# Patient Record
Sex: MALE | Race: WHITE | NOT HISPANIC OR LATINO | Employment: OTHER | URBAN - METROPOLITAN AREA
[De-identification: names, ages, dates, MRNs, and addresses within clinical notes are randomized per-mention and may not be internally consistent; named-entity substitution may affect disease eponyms.]

---

## 2017-02-22 ENCOUNTER — ALLSCRIPTS OFFICE VISIT (OUTPATIENT)
Dept: OTHER | Facility: OTHER | Age: 82
End: 2017-02-22

## 2017-03-17 ENCOUNTER — GENERIC CONVERSION - ENCOUNTER (OUTPATIENT)
Dept: OTHER | Facility: OTHER | Age: 82
End: 2017-03-17

## 2017-05-24 DIAGNOSIS — I48.91 ATRIAL FIBRILLATION (HCC): ICD-10-CM

## 2017-06-12 DIAGNOSIS — N18.30 CHRONIC KIDNEY DISEASE, STAGE III (MODERATE) (HCC): ICD-10-CM

## 2017-06-23 ENCOUNTER — ALLSCRIPTS OFFICE VISIT (OUTPATIENT)
Dept: OTHER | Facility: OTHER | Age: 82
End: 2017-06-23

## 2017-10-23 DIAGNOSIS — I48.91 ATRIAL FIBRILLATION (HCC): ICD-10-CM

## 2017-10-23 DIAGNOSIS — N18.30 CHRONIC KIDNEY DISEASE, STAGE III (MODERATE) (HCC): ICD-10-CM

## 2017-10-23 DIAGNOSIS — M15.9 POLYOSTEOARTHRITIS: ICD-10-CM

## 2017-10-23 DIAGNOSIS — I10 ESSENTIAL (PRIMARY) HYPERTENSION: ICD-10-CM

## 2017-11-09 ENCOUNTER — ALLSCRIPTS OFFICE VISIT (OUTPATIENT)
Dept: OTHER | Facility: OTHER | Age: 82
End: 2017-11-09

## 2017-11-10 NOTE — PROGRESS NOTES
Assessment    1  Atrial fibrillation (427 31) (I48 91)   2  Benign essential hypertension (401 1) (I10)   3  CKD (chronic kidney disease), stage III (585 3) (N18 3)   4  Osteoarthritis, multiple sites (715 89) (M15 9)    Plan  Atrial fibrillation, Benign essential hypertension, CKD (chronic kidney disease), stage III,Osteoarthritis, multiple sites    · (1) CBC/ PLT (NO DIFF); Status:Active; Requested for:02Apr2018;    · (1) COMPREHENSIVE METABOLIC PANEL; Status:Active; Requested for:02Apr2018;    · (1) LIPID PANEL, FASTING; Status:Active; Requested for:02Apr2018;    · (1) URINALYSIS (will reflex a microscopy if leukocytes, occult blood, protein or nitrites are not withinnormal limits); Status:Active; Requested for:02Apr2018; Influenza vaccine needed    · Fluzone High-Dose 0 5 ML Intramuscular Suspension Prefilled Syringe  PMH: Screening for genitourinary condition    · *VB - Urinary Incontinence Screen (Dx Z13 89 Screen for UI) ; every 1 year; Last 64AGC1003; [id number]; Status:Active  Screening for genitourinary condition    · *VB - Urinary Incontinence Screen (Dx Z13 89 Screen for UI); Status:Complete - Retrospective ByProtocol Authorization;   Done: 29HGP9170 03:00PM  Screening for neurological condition    · Falls Risk Assessment (Dx Z13 89 Screen for Neurologic Disorder) ; every 1 year; Last 00WMO5634;AYEM 39VCS5113; Status:Active  Unlinked    · PHQ-2 Adult Depression Screening ; every 1 year; Last 68ACO5857; Next 93UMV3074; Status:Active    Discussion/Summary  Discussion Summary:   The patient appears to be remaining in normal sinus rhythm  We will continue Coumadin  No other changes are necessary to his medications  He has had no issues with any bleeding  At this time he does not wish to pursue any type of surgical intervention regarding his toes  Chief Complaint  Chief Complaint Free Text Note Form: Patient is here for 6 months f/u for atria fibrillation, Hypertension, CKD and osteoarthritis  Review blood work  does not have any new complains at the moment  Chief Complaint Chronic Condition [facility]: Patient is here today for follow up of chronic conditions described in HPI  History of Present Illness  HPI: Patient presents to the office for follow-up visit for stage III kidney disease, hypertension, chronic atrial fibrillation and her 2  Osteoarthritis  He offers no new complaints  Denies any palpitations or chest pain  He denies any peripheral edema  Arthritis has been stable and tolerable  Appetite is good and his weight has been stable  No fevers or chills  He denies any indigestion, constipation, dysuria  His lab work is reviewed  His metabolic panel reveals stable renal function with a creatinine of 1 32 BUN of 23  Electrolytes are normal liver function studies are normal  Total cholesterol is 209 triglycerides 106 HDL cholesterol 67 and LDL cholesterol of 121 CBC is within normal limits  Hemoglobin 13 4 hematocrit 38 6, white count 5900 and platelet count 504,215  Review of Systems  Complete-Male:  Constitutional: No fever or chills, feels well, no tiredness, no recent weight gain or weight loss  Eyes: No complaints of eye pain, no red eyes, no discharge from eyes, no itchy eyes  ENT: no complaints of earache, no hearing loss, no nosebleeds, no nasal discharge, no sore throat, no hoarseness  Cardiovascular: No complaints of slow heart rate, no fast heart rate, no chest pain, no palpitations, no leg claudication, no lower extremity  Respiratory: No complaints of shortness of breath, no wheezing, no cough, no SOB on exertion, no orthopnea or PND  Gastrointestinal: No complaints of abdominal pain, no constipation, no nausea or vomiting, no diarrhea or bloody stools  Musculoskeletal: Arthritic toes of both feet  Integumentary: No complaints of skin rash or skin lesions, no itching, no skin wound, no dry skin    Neurological: No compliants of headache, no confusion, no convulsions, no numbness or tingling, no dizziness or fainting, no limb weakness, no difficulty walking  Psychiatric: Is not suicidal, no sleep disturbances, no anxiety or depression, no change in personality, no emotional problems  Endocrine: No complaints of proptosis, no hot flashes, no muscle weakness, no erectile dysfunction, no deepening of the voice, no feelings of weakness  Hematologic/Lymphatic: No complaints of swollen glands, no swollen glands in the neck, does not bleed easily, no easy bruising  Preventive Quality 65 Older:  Preventive Quality 65 and Older: Falls Risk: The patient fell 0 times in the past 12 months  The patient currently has no urinary incontinence symptoms  Active Problems  1  Atrial fibrillation (427 31) (I48 91)   2  Benign essential hypertension (401 1) (I10)   3  CKD (chronic kidney disease), stage III (585 3) (N18 3)   4  Copy of advance directive in patient's chart (V49 89) (Z78 9)   5  Osteoarthritis, multiple sites (715 89) (M15 9)    Past Medical History  1  History of Acute kidney injury (584 9) (N17 9)   2  History of Acute laryngitis (464 00) (J04 0)   3  History of Acute upper respiratory infection (465 9) (J06 9)   4  History of Anxiety (300 00) (F41 9)   5  History of Arthritis (V13 4)   6  History of Callus of foot (700) (L84)   7  History of Cellulitis of left foot (682 7) (L03 116)   8  History of Cough (786 2) (R05)   9  History of Depression screening (V79 0) (Z13 89)   10  History of Edema extremities (782 3) (R60 0)   11  History of Esophagitis, reflux (530 11) (K21 0)   12  History of Foreign body in eye, left, initial encounter (76 310 744) (T15 92XA)   13  History of allergic rhinitis (V12 69) (Z87 09)   14  History of angina pectoris (V12 59) (Z86 79)   15  History of arthritis (V13 4) (Z87 39)   16  History of atrial fibrillation (V12 59) (Z86 79)   17  History of cataract (V12 49) (Z86 69)   18  History of chest pain (V13 89) (Z87 898)   19   History of chest pain (V13 89) (Z87 898)   20  History of dizziness (V13 89) (Z87 898)   21  History of gastroesophageal reflux (GERD) (V12 79) (Z87 19)   22  History of hypertension (V12 59) (Z86 79)   23  History of impacted cerumen (V12 49) (Z86 69)   24  History of rheumatic fever (V12 09) (Z86 79)   25  History of right bundle branch block (V15 1) (Z98 89)   26  History of shortness of breath (V13 89) (Z87 898)   27  History of Non-healing wound   28  History of Osteoarthritis of both knees (715 96) (M17 0)   29  History of Tachycardia (785 0) (R00 0)   30  History of Urinary tract infection (599 0) (N39 0)   31  History of Venous thrombosis (453 9) (I82 90)   32  History of Ventral hernia (553 20) (K43 9)   33  History of Vitamin D deficiency (268 9) (E55 9)  Active Problems And Past Medical History Reviewed: The active problems and past medical history were reviewed and updated today  Surgical History  1  Denied: History Of Prior Surgery    Family History  Mother    1  No pertinent family history  Family History    2  Family history of Coronary Artery Disease (V17 49)    Social History     · Alcohol Use (History)   · Beer Consumption (4  Bottles Per Day)   · Caffeine Use   · Copy of advance directive in patient's chart (V49 89) (Z78 9)   · Former smoker (J99 43) (A56 978)   · Never Used Drugs  Social History Reviewed: The social history was reviewed and updated today  The social history was reviewed and is unchanged  Current Meds   1  DilTIAZem HCl ER Coated Beads 240 MG Oral Capsule Extended Release 24 Hour; TAKE 1 CAPSULE DAILY  Requested for: 96UCR6535; Last Rx:31Oct2017 Ordered   2  Losartan Potassium 50 MG Oral Tablet; TAKE 1 TABLET DAILY; Therapy: 09JGG4129 to (Evaluate:27Jan2018)  Requested for: 84Rnh5401; Last Rx:97Ijt6848 Ordered   3  Nitrostat 0 4 MG Sublingual Tablet Sublingual; PLACE 1 TABLET UNDER THE TONGUE EVERY 5 MINUTES FOR UP TO 3 DOSES AS NEEDED FOR CHEST PAIN  CALL 911 IF PAIN PERSISTS  Requested for: 16WUN6515; Last Rx:28Jan2016 Ordered   4  Omeprazole 20 MG Oral Capsule Delayed Release; TAKE 1 CAPSULE DAILY; Therapy: (Recorded:00Yni2906) to Recorded   5  Warfarin Sodium 5 MG Oral Tablet; TAKE 1 TABLET DAILY AS DIRECTED  Requested for: 69UOJ6787; Last Rx:36Sxy9370; Status: ACTIVE - Renewal Denied Ordered  Medication List Reviewed: The medication list was reviewed and updated today  Allergies  1  Augmentin TABS   2  Indocin CAPS   3  amlodipine   4  Nitro-Bid OINT   5  Penicillins    Vitals  Vital Signs    Recorded: 09TIQ4219 02:15PM   Temperature 98 5 F, Oral   Heart Rate 76   Systolic 415, LUE, Sitting   Diastolic 70, LUE, Sitting   Height 5 ft 7 in   Weight 176 lb    BMI Calculated 27 57   BSA Calculated 1 92   O2 Saturation 98       Physical Exam   Constitutional  General appearance: No acute distress, well appearing and well nourished  Eyes  Conjunctiva and lids: No swelling, erythema, or discharge  Pupils and irises: Equal, round and reactive to light  Ears, Nose, Mouth, and Throat  External inspection of ears and nose: Normal    Pulmonary  Respiratory effort: No increased work of breathing or signs of respiratory distress  Auscultation of lungs: Clear to auscultation, equal breath sounds bilaterally, no wheezes, no rales, no rhonci  Cardiovascular  Palpation of heart: Normal PMI, no thrills  Auscultation of heart: Abnormal  -- Soft 1/6 systolic murmur heard best along the left upper sternal border second intercostal space without radiation  Examination of extremities for edema and/or varicosities: Normal    Carotid pulses: Normal    Abdomen  Abdomen: Non-tender, no masses  Lymphatic  Palpation of lymph nodes in neck: No lymphadenopathy  Musculoskeletal  Gait and station: Normal    Digits and nails: Abnormal  -- Bilateral hammertoes  Arthritis of the metacarpophalangeal joints of both hands    Inspection/palpation of joints, bones, and muscles: Normal    Skin  Skin and subcutaneous tissue: Normal without rashes or lesions  Neurologic No focal sensory or motor deficits are appreciated  Psychiatric  Orientation to person, place and time: Normal    Mood and affect: Normal          Results/Data  *VB - Urinary Incontinence Screen (Dx Z13 89 Screen for UI) 38WDS3353 03:00PM Javier Cavazos     Test Name Result Flag Reference   Urinary Incontinence Assessment 69CVJ3810       PHQ-2 Adult Depression Screening 39WRB3435 02:58PM User, Ahs     Test Name Result Flag Reference   PHQ-2 Adult Depression Score 0       Over the last two weeks, how often have you been bothered by any of the following problems? Little interest or pleasure in doing things: Not at all - 0 Feeling down, depressed, or hopeless: Not at all - 0   PHQ-2 Adult Depression Screening Negative       Falls Risk Assessment (Dx Z13 89 Screen for Neurologic Disorder) 59UYG2053 02:56PM User, Ahs     Test Name Result Flag Reference   Falls Risk      Falls with injury in the past year         Health Management  History of Screening for genitourinary condition   *VB - Urinary Incontinence Screen (Dx Z13 89 Screen for UI); every 1 year; Last 92HBU9138; NextDue: 93DED2681; Active  History of Screening for neurological condition   *VB - Fall Risk Assessment  (Dx Z13 89 Screen for Neurologic Disorder); every 1 year; [id number]; Next Due: 09Mbq6704; Overdue  Screening for neurological condition   Falls Risk Assessment (Dx Z13 89 Screen for Neurologic Disorder); every 1 year; Last 61VMF1309;UTLF Due: 20KVE2455; Active  Health Maintenance   Medicare Annual Wellness Visit; every 1 year; Last 34Qbb3376; Next Due: 75Ncx1254;  Overdue    Signatures   Electronically signed by : William Ko MD; Nov 9 2017  3:39PM EST                       (Author)

## 2017-11-30 ENCOUNTER — GENERIC CONVERSION - ENCOUNTER (OUTPATIENT)
Dept: OTHER | Facility: OTHER | Age: 82
End: 2017-11-30

## 2017-11-30 DIAGNOSIS — I48.91 ATRIAL FIBRILLATION (HCC): ICD-10-CM

## 2017-12-01 ENCOUNTER — GENERIC CONVERSION - ENCOUNTER (OUTPATIENT)
Dept: OTHER | Facility: OTHER | Age: 82
End: 2017-12-01

## 2018-01-12 DIAGNOSIS — I48.91 ATRIAL FIBRILLATION (HCC): ICD-10-CM

## 2018-01-13 VITALS
BODY MASS INDEX: 27.62 KG/M2 | DIASTOLIC BLOOD PRESSURE: 70 MMHG | SYSTOLIC BLOOD PRESSURE: 124 MMHG | WEIGHT: 176 LBS | HEIGHT: 67 IN | OXYGEN SATURATION: 98 % | HEART RATE: 76 BPM | TEMPERATURE: 98.5 F

## 2018-01-14 VITALS
TEMPERATURE: 98.1 F | WEIGHT: 179 LBS | HEART RATE: 84 BPM | BODY MASS INDEX: 28.09 KG/M2 | OXYGEN SATURATION: 98 % | DIASTOLIC BLOOD PRESSURE: 68 MMHG | SYSTOLIC BLOOD PRESSURE: 120 MMHG | HEIGHT: 67 IN

## 2018-01-14 VITALS
OXYGEN SATURATION: 98 % | SYSTOLIC BLOOD PRESSURE: 140 MMHG | HEIGHT: 67 IN | HEART RATE: 78 BPM | TEMPERATURE: 97.8 F | DIASTOLIC BLOOD PRESSURE: 60 MMHG | WEIGHT: 180.38 LBS | BODY MASS INDEX: 28.31 KG/M2

## 2018-01-15 ENCOUNTER — GENERIC CONVERSION - ENCOUNTER (OUTPATIENT)
Dept: OTHER | Facility: OTHER | Age: 83
End: 2018-01-15

## 2018-01-16 NOTE — MISCELLANEOUS
Message   Recorded as Task   Date: 11/30/2017 12:18 PM, Created By: Sky Kong   Task Name: Care Coordination   Assigned To: Terrell Cano   Regarding Patient: Nic Cuello, Status: Active   Comment:    Sky Kong - 30 Nov 2017 12:18 PM     TASK CREATED  Jaydaangelica Savage from [facility] called and needs a standing order for patients inr faxed to them at [phone] - 30 Nov 2017 1:03 PM     TASK REASSIGNED: Previously Assigned To Jony Hooper - 30 Nov 2017 4:58 PM     TASK EDITED  Order processed and faxed as requested  Active Problems    1  Atrial fibrillation (427 31) (I48 91)   2  Benign essential hypertension (401 1) (I10)   3  CKD (chronic kidney disease), stage III (585 3) (N18 3)   4  Copy of advance directive in patient's chart (V49 89) (Z78 9)   5  Osteoarthritis, multiple sites (715 89) (M15 9)   6  Screening for genitourinary condition (V81 6) (Z13 89)   7  Screening for neurological condition (V80 09) (Z13 89)    Current Meds   1  DilTIAZem HCl ER Coated Beads 240 MG Oral Capsule Extended Release 24 Hour;   TAKE 1 CAPSULE DAILY  Requested for: 66DXY7966; Last Rx:31Oct2017 Ordered   2  Losartan Potassium 50 MG Oral Tablet; TAKE 1 TABLET DAILY; Therapy: 09SGJ9467 to (Evaluate:27Jan2018)  Requested for: 73Wwu4120; Last   Rx:44Gca8501 Ordered   3  Nitrostat 0 4 MG Sublingual Tablet Sublingual (Nitroglycerin); PLACE 1 TABLET UNDER   THE TONGUE EVERY 5 MINUTES FOR UP TO 3 DOSES AS NEEDED   FOR CHEST PAIN  CALL 911 IF PAIN PERSISTS  Requested for: 37ORV2639; Last   Rx:28Jan2016 Ordered   4  Omeprazole 20 MG Oral Capsule Delayed Release; TAKE 1 CAPSULE DAILY; Therapy: (Recorded:93Tem4181) to Recorded   5  Warfarin Sodium 5 MG Oral Tablet; TAKE 1 TABLET DAILY AS DIRECTED  Requested   for: 30DSD8480; Last Rx:23Wci6884; Status: ACTIVE - Renewal Denied Ordered    Allergies    1  Augmentin TABS   2  Indocin CAPS   3  amlodipine   4  Nitro-Bid OINT   5  Penicillins    Plan  Atrial fibrillation    · (1) PT WITH INR; Status:Active - Retrospective By Protocol Authorization;  Requested  for:30Nov2017;     Signatures   Electronically signed by : Gopi Martin RN; Nov 30 2017  4:58PM EST                       (Author)

## 2018-01-31 DIAGNOSIS — I10 ESSENTIAL HYPERTENSION, BENIGN: Primary | ICD-10-CM

## 2018-01-31 RX ORDER — LOSARTAN POTASSIUM 50 MG/1
1 TABLET ORAL DAILY
COMMUNITY
Start: 2014-11-10 | End: 2018-01-31 | Stop reason: SDUPTHER

## 2018-02-01 RX ORDER — LOSARTAN POTASSIUM 50 MG/1
50 TABLET ORAL DAILY
Qty: 90 TABLET | Refills: 1 | Status: SHIPPED | OUTPATIENT
Start: 2018-02-01 | End: 2018-08-02 | Stop reason: SDUPTHER

## 2018-02-20 LAB — INR PPP: 3.3 (ref 0.86–1.16)

## 2018-02-23 ENCOUNTER — ANTICOAG VISIT (OUTPATIENT)
Dept: INTERNAL MEDICINE CLINIC | Age: 83
End: 2018-02-23

## 2018-02-23 ENCOUNTER — TELEPHONE (OUTPATIENT)
Dept: INTERNAL MEDICINE CLINIC | Facility: CLINIC | Age: 83
End: 2018-02-23

## 2018-02-23 DIAGNOSIS — I48.20 CHRONIC ATRIAL FIBRILLATION (HCC): Primary | ICD-10-CM

## 2018-03-05 ENCOUNTER — TELEPHONE (OUTPATIENT)
Dept: INTERNAL MEDICINE CLINIC | Age: 83
End: 2018-03-05

## 2018-03-13 DIAGNOSIS — I48.91 ATRIAL FIBRILLATION, UNSPECIFIED TYPE (HCC): Primary | ICD-10-CM

## 2018-03-13 RX ORDER — WARFARIN SODIUM 5 MG/1
5 TABLET ORAL DAILY
Qty: 90 TABLET | Refills: 0 | Status: SHIPPED | OUTPATIENT
Start: 2018-03-13 | End: 2018-06-11 | Stop reason: SDUPTHER

## 2018-03-13 RX ORDER — WARFARIN SODIUM 5 MG/1
1 TABLET ORAL DAILY
COMMUNITY
End: 2018-03-13 | Stop reason: SDUPTHER

## 2018-03-27 LAB — INR PPP: 2.5 (ref 0.86–1.16)

## 2018-03-28 ENCOUNTER — ANTICOAG VISIT (OUTPATIENT)
Dept: INTERNAL MEDICINE CLINIC | Age: 83
End: 2018-03-28

## 2018-04-02 DIAGNOSIS — I48.91 ATRIAL FIBRILLATION (HCC): ICD-10-CM

## 2018-04-02 DIAGNOSIS — M15.9 POLYOSTEOARTHRITIS: ICD-10-CM

## 2018-04-02 DIAGNOSIS — N18.30 CHRONIC KIDNEY DISEASE, STAGE III (MODERATE) (HCC): ICD-10-CM

## 2018-04-02 DIAGNOSIS — I10 ESSENTIAL (PRIMARY) HYPERTENSION: ICD-10-CM

## 2018-04-19 LAB — INR PPP: 2.9 (ref 0.86–1.16)

## 2018-04-23 ENCOUNTER — ANTICOAG VISIT (OUTPATIENT)
Dept: INTERNAL MEDICINE CLINIC | Age: 83
End: 2018-04-23

## 2018-04-26 ENCOUNTER — OFFICE VISIT (OUTPATIENT)
Dept: INTERNAL MEDICINE CLINIC | Facility: CLINIC | Age: 83
End: 2018-04-26
Payer: COMMERCIAL

## 2018-04-26 VITALS
HEART RATE: 80 BPM | BODY MASS INDEX: 26.64 KG/M2 | HEIGHT: 68 IN | OXYGEN SATURATION: 98 % | WEIGHT: 175.8 LBS | TEMPERATURE: 98.2 F | SYSTOLIC BLOOD PRESSURE: 126 MMHG | DIASTOLIC BLOOD PRESSURE: 66 MMHG

## 2018-04-26 DIAGNOSIS — I48.20 CHRONIC ATRIAL FIBRILLATION (HCC): ICD-10-CM

## 2018-04-26 DIAGNOSIS — R04.0 EPISTAXIS: ICD-10-CM

## 2018-04-26 DIAGNOSIS — I10 HYPERTENSION, UNSPECIFIED TYPE: Primary | ICD-10-CM

## 2018-04-26 DIAGNOSIS — I10 BENIGN ESSENTIAL HYPERTENSION: ICD-10-CM

## 2018-04-26 DIAGNOSIS — N18.30 CKD (CHRONIC KIDNEY DISEASE), STAGE III (HCC): ICD-10-CM

## 2018-04-26 DIAGNOSIS — R07.9 CHEST PAIN, UNSPECIFIED TYPE: ICD-10-CM

## 2018-04-26 DIAGNOSIS — M15.9 PRIMARY OSTEOARTHRITIS INVOLVING MULTIPLE JOINTS: ICD-10-CM

## 2018-04-26 PROCEDURE — 3074F SYST BP LT 130 MM HG: CPT | Performed by: INTERNAL MEDICINE

## 2018-04-26 PROCEDURE — 99214 OFFICE O/P EST MOD 30 MIN: CPT | Performed by: INTERNAL MEDICINE

## 2018-04-26 PROCEDURE — 3078F DIAST BP <80 MM HG: CPT | Performed by: INTERNAL MEDICINE

## 2018-04-26 RX ORDER — NITROGLYCERIN 0.4 MG/1
1 TABLET SUBLINGUAL
COMMUNITY
End: 2018-04-26 | Stop reason: SDUPTHER

## 2018-04-26 RX ORDER — DILTIAZEM HYDROCHLORIDE 240 MG/1
1 CAPSULE, COATED, EXTENDED RELEASE ORAL DAILY
COMMUNITY
End: 2018-04-26 | Stop reason: SDUPTHER

## 2018-04-26 RX ORDER — DILTIAZEM HYDROCHLORIDE 240 MG/1
240 CAPSULE, COATED, EXTENDED RELEASE ORAL DAILY
Qty: 90 CAPSULE | Refills: 1 | Status: SHIPPED | OUTPATIENT
Start: 2018-04-26 | End: 2018-10-16 | Stop reason: SDUPTHER

## 2018-04-26 RX ORDER — NITROGLYCERIN 0.4 MG/1
0.4 TABLET SUBLINGUAL
Qty: 90 TABLET | Refills: 1 | Status: SHIPPED | OUTPATIENT
Start: 2018-04-26

## 2018-04-26 NOTE — PROGRESS NOTES
Assessment/Plan:    Atrial fibrillation (HCC)  Controlled ventricular response no change in medications  Benign essential hypertension    Blood pressure is well controlled  Lab work essentially stable  No changes are necessary to current medications  Osteoarthritis, multiple sites    Patient could possibly benefit from some podiatric surgery but is reluctant to do so    CKD (chronic kidney disease), stage III   Renal function has remained stable  His urinalysis shows micro hematuria  Previous ultrasounds of the kidneys were normal     Epistaxis    No bleeding at the present time  A small focus with eschar formation and signs of recent hemorrhage noted in the left nasal septum  Diagnoses and all orders for this visit:    Hypertension, unspecified type  -     diltiazem (CARDIZEM CD) 240 mg 24 hr capsule; Take 1 capsule (240 mg total) by mouth daily  -     Comprehensive metabolic panel; Future  -     CBC and differential; Future    Chest pain, unspecified type  -     nitroglycerin (NITROSTAT) 0 4 mg SL tablet; Place 1 tablet (0 4 mg total) under the tongue every 5 (five) minutes as needed for chest pain  -     Comprehensive metabolic panel; Future  -     CBC and differential; Future    Primary osteoarthritis involving multiple joints  -     Comprehensive metabolic panel; Future  -     CBC and differential; Future    Chronic atrial fibrillation (HCC)  -     Comprehensive metabolic panel; Future  -     CBC and differential; Future    Benign essential hypertension  -     Comprehensive metabolic panel; Future  -     CBC and differential; Future    CKD (chronic kidney disease), stage III  -     Comprehensive metabolic panel; Future  -     CBC and differential; Future    Epistaxis    Other orders  -     Discontinue: diltiazem (CARDIZEM CD) 240 mg 24 hr capsule;  Take 1 capsule by mouth daily  -     Discontinue: nitroglycerin (NITROSTAT) 0 4 mg SL tablet; Place 1 tablet under the tongue every 5 (five) minutes as needed          Subjective:      Patient ID: Chris Guerra is a 80 y o  male  Patient presents for follow-up visit for chronic atrial fibrillation, hypertension, stage 3 kidney disease, osteoarthritis of the toes and has no major complaints  He does experience some intermittent at episodes of epistaxis in his left nostril but has not experience any prolonged bleeding  He is on warfarin  The bleeding usually subsides with direct pressure  He offers no complaints regarding his blood pressure  He has not had any recent episodes of chest pain  His nitroglycerin prescription is over 3years old so he needs replacement  He has not used his nitroglycerin in over 2 years  Appetite has been good weight has been stable he denies any peripheral edema or swelling he denies any changes in his bowel habits  He gives no history of gross hematuria  Labs are reviewed  BUN is 22 creatinine 1 31 estimated GFR is 50  Electrolytes are normal   CBC was normal   Urinalysis showed some microscopic hematuria but was otherwise unremarkable  Family History   Problem Relation Age of Onset    No Known Problems Mother     Coronary artery disease Family     Emphysema Father      Social History     Social History    Marital status: /Civil Union     Spouse name: N/A    Number of children: N/A    Years of education: N/A     Occupational History    Not on file       Social History Main Topics    Smoking status: Former Smoker     Types: Cigarettes    Smokeless tobacco: Never Used    Alcohol use Yes      Comment: beer consumption (4 bottles per day)    Drug use: No    Sexual activity: Not on file     Other Topics Concern    Not on file     Social History Narrative    Caffeine use    Copy of advance directive in patient's chart     Past Medical History:   Diagnosis Date    Allergic rhinitis     last assessed: 11/10/2014    [facility]ina p[facility]is ([facility] [address] [facility]     Anxiety     Arthritis     last assessed: 8/3/2015   Josef Jackson Atrial fibrillation ([facility] [address] )     Callus of foot     last assessed: 9/7/2016    Cataract     CKD (chronic kidney disease), stage III 9/7/2016    Esophagitis, reflux     last assessed: 11/10/2014    Hypertension     Osteoarthritis of both knees     Rheumatic fever     Right bundle branch block     Shortness of breath     Tachycardia     Venous thrombosis     Ventral hernia     last assessed: 3/20/2015    Vitamin D deficiency        Current Outpatient Prescriptions:     diltiazem (CARDIZEM CD) 240 mg 24 hr capsule, Take 1 capsule (240 mg total) by mouth daily, Disp: 90 capsule, Rfl: 1    losartan (COZAAR) 50 mg tablet, Take 1 tablet (50 mg total) by mouth daily, Disp: 90 tablet, Rfl: 1    nitroglycerin (NITROSTAT) 0 4 mg SL tablet, Place 1 tablet (0 4 mg total) under the tongue every 5 (five) minutes as needed for chest pain, Disp: 90 tablet, Rfl: 1    warfarin (COUMADIN) 5 mg tablet, Take 1 tablet (5 mg total) by mouth daily or as directed, Disp: 90 tablet, Rfl: 0  Allergies   Allergen Reactions    Amlodipine     Augmentin  [Amoxicillin-Pot Clavulanate] GI Intolerance    Indomethacin GI Intolerance    Nitroglycerin     Penicillins      History reviewed  No pertinent surgical history  Review of Systems   Constitutional: Negative  HENT: Positive for nosebleeds  Eyes: Negative  Respiratory: Negative  Cardiovascular: Negative  Gastrointestinal: Negative  Endocrine: Negative  Genitourinary: Negative  Musculoskeletal: Negative  Allergic/Immunologic: Negative  Neurological: Negative  Hematological: Negative  Psychiatric/Behavioral: Negative  Objective:      /66 (BP Location: Left arm, Patient Position: Sitting, Cuff Size: Standard)   Pulse 80   Temp 98 2 °F (36 8 °C) (Oral)   Ht 5' 8" (1 727 m)   Wt 79 7 kg (175 lb 12 8 oz)   SpO2 98%   BMI 26 73 kg/m²          Physical Exam   Constitutional: He is oriented to person, place, and time   He appears well-developed and well-nourished  HENT:   Head: Normocephalic and atraumatic  Nose: Epistaxis (Healed area of bleeding noted in the left nasal septum) is observed  Eyes: Conjunctivae and EOM are normal  Pupils are equal, round, and reactive to light  Neck: Normal range of motion  Neck supple  No JVD present  No tracheal deviation present  No thyromegaly present  Cardiovascular: Normal heart sounds and intact distal pulses  No murmur heard  Irregularly irregular, no murmur appreciated  Ventricular response 76   Pulmonary/Chest: Effort normal and breath sounds normal  No respiratory distress  He has no rales  Abdominal: Soft  Bowel sounds are normal  He exhibits no distension  There is no tenderness  Musculoskeletal: Normal range of motion  He exhibits deformity (Arthritic toes with hammertoe deformity and bunion formation bilaterally)  He exhibits no edema  Lymphadenopathy:     He has no cervical adenopathy  Neurological: He is alert and oriented to person, place, and time  He has normal reflexes  No cranial nerve deficit  Coordination normal    Skin: Skin is warm and dry  Psychiatric: He has a normal mood and affect  Thought content normal    Vitals reviewed

## 2018-04-26 NOTE — ASSESSMENT & PLAN NOTE
Blood pressure is well controlled  Lab work essentially stable  No changes are necessary to current medications

## 2018-04-26 NOTE — ASSESSMENT & PLAN NOTE
No bleeding at the present time  A small focus with eschar formation and signs of recent hemorrhage noted in the left nasal septum

## 2018-04-26 NOTE — ASSESSMENT & PLAN NOTE
Renal function has remained stable  His urinalysis shows micro hematuria    Previous ultrasounds of the kidneys were normal

## 2018-05-18 ENCOUNTER — TELEPHONE (OUTPATIENT)
Dept: INTERNAL MEDICINE CLINIC | Facility: CLINIC | Age: 83
End: 2018-05-18

## 2018-05-22 ENCOUNTER — ANTICOAG VISIT (OUTPATIENT)
Dept: INTERNAL MEDICINE CLINIC | Facility: CLINIC | Age: 83
End: 2018-05-22

## 2018-05-22 LAB — INR PPP: 3.1 (ref 0.86–1.17)

## 2018-06-07 ENCOUNTER — ANTICOAG VISIT (OUTPATIENT)
Dept: INTERNAL MEDICINE CLINIC | Facility: CLINIC | Age: 83
End: 2018-06-07

## 2018-06-07 LAB — INR PPP: 2.5 (ref 0.86–1.17)

## 2018-06-10 DIAGNOSIS — I48.91 ATRIAL FIBRILLATION, UNSPECIFIED TYPE (HCC): ICD-10-CM

## 2018-06-10 RX ORDER — WARFARIN SODIUM 5 MG/1
5 TABLET ORAL DAILY
Qty: 90 TABLET | Refills: 0 | Status: CANCELLED | OUTPATIENT
Start: 2018-06-10

## 2018-06-11 DIAGNOSIS — I48.91 ATRIAL FIBRILLATION, UNSPECIFIED TYPE (HCC): ICD-10-CM

## 2018-06-12 RX ORDER — WARFARIN SODIUM 5 MG/1
5 TABLET ORAL DAILY
Qty: 90 TABLET | Refills: 1 | Status: SHIPPED | OUTPATIENT
Start: 2018-06-12 | End: 2018-12-31 | Stop reason: SDUPTHER

## 2018-07-09 ENCOUNTER — ANTICOAG VISIT (OUTPATIENT)
Dept: INTERNAL MEDICINE CLINIC | Facility: CLINIC | Age: 83
End: 2018-07-09

## 2018-07-09 DIAGNOSIS — I48.20 CHRONIC ATRIAL FIBRILLATION (HCC): Primary | ICD-10-CM

## 2018-07-09 LAB — INR PPP: 2.3 (ref 0.86–1.17)

## 2018-07-27 DIAGNOSIS — I10 ESSENTIAL HYPERTENSION, BENIGN: ICD-10-CM

## 2018-07-27 RX ORDER — LOSARTAN POTASSIUM 50 MG/1
50 TABLET ORAL DAILY
Qty: 90 TABLET | Refills: 1 | OUTPATIENT
Start: 2018-07-27

## 2018-08-02 DIAGNOSIS — I10 ESSENTIAL HYPERTENSION, BENIGN: ICD-10-CM

## 2018-08-02 RX ORDER — LOSARTAN POTASSIUM 50 MG/1
50 TABLET ORAL DAILY
Qty: 90 TABLET | Refills: 1 | Status: SHIPPED | OUTPATIENT
Start: 2018-08-02 | End: 2018-10-24 | Stop reason: SDUPTHER

## 2018-08-03 ENCOUNTER — OFFICE VISIT (OUTPATIENT)
Dept: INTERNAL MEDICINE CLINIC | Facility: CLINIC | Age: 83
End: 2018-08-03
Payer: COMMERCIAL

## 2018-08-03 VITALS
SYSTOLIC BLOOD PRESSURE: 132 MMHG | BODY MASS INDEX: 26.1 KG/M2 | OXYGEN SATURATION: 98 % | DIASTOLIC BLOOD PRESSURE: 70 MMHG | HEIGHT: 68 IN | WEIGHT: 172.2 LBS | HEART RATE: 95 BPM | TEMPERATURE: 98.4 F

## 2018-08-03 DIAGNOSIS — J32.1 FRONTAL SINUSITIS, UNSPECIFIED CHRONICITY: Primary | ICD-10-CM

## 2018-08-03 DIAGNOSIS — H61.23 BILATERAL IMPACTED CERUMEN: ICD-10-CM

## 2018-08-03 PROBLEM — H61.20 CERUMEN IMPACTION: Status: ACTIVE | Noted: 2018-08-03

## 2018-08-03 PROBLEM — J32.9 SINUSITIS: Status: ACTIVE | Noted: 2018-08-03

## 2018-08-03 PROCEDURE — 69210 REMOVE IMPACTED EAR WAX UNI: CPT | Performed by: NURSE PRACTITIONER

## 2018-08-03 PROCEDURE — 99213 OFFICE O/P EST LOW 20 MIN: CPT | Performed by: NURSE PRACTITIONER

## 2018-08-03 PROCEDURE — 1160F RVW MEDS BY RX/DR IN RCRD: CPT | Performed by: NURSE PRACTITIONER

## 2018-08-03 RX ORDER — CEFUROXIME AXETIL 500 MG/1
250 TABLET ORAL EVERY 12 HOURS SCHEDULED
Qty: 7 TABLET | Refills: 0 | Status: SHIPPED | OUTPATIENT
Start: 2018-08-03 | End: 2018-08-10

## 2018-08-03 NOTE — ASSESSMENT & PLAN NOTE
Flushed years in office today, advised patient to get over-the-counter Debrox drops to help soften the wax

## 2018-08-03 NOTE — PROGRESS NOTES
Assessment/Plan:    Sinusitis  Will start patient on Ceftin BID for 7 days  Of note patient has completed his pneumonia vaccination  Patient does history of GI upset with Augmentin and penicillin  Advised patient to take antibiotic with food to prevent GI upset  Rest and fluids advised  Educated that the course of this illness could be 2-4 weeks  Discussed symptomatic relief, such as warm steam inhalations, tylenol/ibuprofen for fevers and body aches, rest, and drink plenty of fluids  Warm salt gargles for sore throat  Discussed red flag signs to go to the ER, such as chest pain or shortness of breath  Return to the office for reevaluation if symptoms worsen or do not improve in 1-2 weeks      Cerumen impaction   Flushed years in office today, advised patient to get over-the-counter Debrox drops to help soften the wax  Diagnoses and all orders for this visit:    Frontal sinusitis, unspecified chronicity  -     cefuroxime (CEFTIN) 500 mg tablet; Take 0 5 tablets (250 mg total) by mouth every 12 (twelve) hours for 7 days    Bilateral impacted cerumen    Other orders  -     Ear cerumen removal          Subjective:      Patient ID: Guillermina Fierro is a 80 y o  male  Patient presents today with complaints of cough, chest congestion, nasal congestion  Patient states that the symptoms have been going on for about 10 days and are gradually getting worse  He states that his wife is currently in the hospital for a fall and he has been visiting her  The day after she was admitted he had visited her and came home and was sick  Patient does have history of both the pneumonia vaccinations  He has history of smoking and denies history of seasonal allergies  Cough   This is a new problem  Episode onset: 10 days ago  The problem has been waxing and waning  Cough characteristics: productive of yellow sputum   Associated symptoms include ear congestion, headaches, nasal congestion, postnasal drip, rhinorrhea and a sore throat  Pertinent negatives include no chest pain, chills, ear pain, fever, heartburn, rash, shortness of breath or wheezing  Nothing aggravates the symptoms  Treatments tried: Claratin  There is no history of asthma, environmental allergies or pneumonia  The following portions of the patient's history were reviewed and updated as appropriate: allergies, current medications, past family history, past medical history, past social history, past surgical history and problem list     Review of Systems   Constitutional: Negative for activity change, appetite change, chills, diaphoresis, fatigue and fever  HENT: Positive for postnasal drip, rhinorrhea and sore throat  Negative for congestion, ear discharge, ear pain, sinus pain and sinus pressure  Eyes: Negative for pain, discharge, itching and visual disturbance  Respiratory: Positive for cough  Negative for chest tightness, shortness of breath and wheezing  Cardiovascular: Negative for chest pain, palpitations and leg swelling  Gastrointestinal: Negative for abdominal pain, constipation, diarrhea, heartburn, nausea and vomiting  Endocrine: Negative for polydipsia, polyphagia and polyuria  Genitourinary: Negative for difficulty urinating, dysuria and urgency  Musculoskeletal: Negative for arthralgias, back pain and neck pain  Skin: Negative for rash and wound  Allergic/Immunologic: Negative for environmental allergies  Neurological: Positive for headaches  Negative for dizziness, weakness and numbness           Past Medical History:   Diagnosis Date    Allergic rhinitis     last assessed: 11/10/2014    Angina pectoris ([facility] [address] )     Anxiety     Arthritis     last assessed: 8/3/2015    Atrial fibrillation (HCC)     Callus of foot     last assessed: 9/7/2016    Cataract     CKD (chronic kidney disease), stage III 9/7/2016    Esophagitis, reflux     last assessed: 11/10/2014    Hypertension     Osteoarthritis of both knees     Rheumatic fever     Right bundle branch block     Shortness of breath     Tachycardia     Venous thrombosis     Ventral hernia     last assessed: 3/20/2015    Vitamin D deficiency          Current Outpatient Prescriptions:     diltiazem (CARDIZEM CD) 240 mg 24 hr capsule, Take 1 capsule (240 mg total) by mouth daily, Disp: 90 capsule, Rfl: 1    losartan (COZAAR) 50 mg tablet, Take 1 tablet (50 mg total) by mouth daily, Disp: 90 tablet, Rfl: 1    nitroglycerin (NITROSTAT) 0 4 mg SL tablet, Place 1 tablet (0 4 mg total) under the tongue every 5 (five) minutes as needed for chest pain, Disp: 90 tablet, Rfl: 1    warfarin (COUMADIN) 5 mg tablet, Take 1 tablet (5 mg total) by mouth daily or as directed, Disp: 90 tablet, Rfl: 1    cefuroxime (CEFTIN) 500 mg tablet, Take 0 5 tablets (250 mg total) by mouth every 12 (twelve) hours for 7 days, Disp: 7 tablet, Rfl: 0    Allergies   Allergen Reactions    Amlodipine     Augmentin  [Amoxicillin-Pot Clavulanate] GI Intolerance    Indomethacin GI Intolerance    Nitroglycerin     Penicillins        Social History   History reviewed  No pertinent surgical history  Family History   Problem Relation Age of Onset    No Known Problems Mother     Coronary artery disease Family     Emphysema Father        Objective:  /70 (BP Location: Left arm, Patient Position: Sitting, Cuff Size: Standard)   Pulse 95   Temp 98 4 °F (36 9 °C) (Oral)   Ht 5' 8" (1 727 m)   Wt 78 1 kg (172 lb 3 2 oz)   SpO2 98%   BMI 26 18 kg/m²     Recent Results (from the past 1344 hour(s))   Protime-INR    Collection Time: 07/07/18 12:00 AM   Result Value Ref Range    INR 2 30 (A) 0 86 - 1 17            Physical Exam   Constitutional: He is oriented to person, place, and time  He appears well-developed and well-nourished  No distress  HENT:   Head: Normocephalic and atraumatic  Right Ear: External ear normal  Tympanic membrane is not erythematous and not bulging   A middle ear effusion is present  Left Ear: External ear normal  Tympanic membrane is not erythematous and not bulging  A middle ear effusion is present  Nose: Mucosal edema and rhinorrhea present  Right sinus exhibits frontal sinus tenderness  Right sinus exhibits no maxillary sinus tenderness  Left sinus exhibits frontal sinus tenderness  Left sinus exhibits no maxillary sinus tenderness  Mouth/Throat: Mucous membranes are pale and dry  Posterior oropharyngeal erythema present  No oropharyngeal exudate  Bilateral cerumen impaction   Eyes: Conjunctivae and EOM are normal  Pupils are equal, round, and reactive to light  Right eye exhibits no discharge  Left eye exhibits no discharge  Neck: Normal range of motion  Neck supple  No thyromegaly present  Cardiovascular: Normal rate, regular rhythm, normal heart sounds and intact distal pulses  Exam reveals no gallop and no friction rub  No murmur heard  Pulmonary/Chest: Effort normal and breath sounds normal  No stridor  No respiratory distress  He has no wheezes  He has no rales  Abdominal: Soft  Bowel sounds are normal  He exhibits no distension  There is no tenderness  Lymphadenopathy:     He has no cervical adenopathy  Neurological: He is alert and oriented to person, place, and time  Skin: Skin is warm and dry  No rash noted  He is not diaphoretic  No erythema  Psychiatric: He has a normal mood and affect  His behavior is normal  Judgment and thought content normal        Ear cerumen removal  Date/Time: 8/3/2018 10:21 AM  Performed by: Nicole Dennis Authorized by: Nicole Dennis      Patient location:  Other (comment)  Consent:     Consent obtained:  Verbal    Consent given by:  Patient    Risks discussed:  Bleeding, dizziness, infection, incomplete removal, pain and TM perforation  Universal protocol:     Patient identity confirmed:  Verbally with patient  Procedure details:     Local anesthetic:  None    Location:  R ear and L ear    Procedure type: irrigation      Approach:  External    Visualization (free text):    Otoscope    Equipment used:   alligator forceps  Post-procedure details:     Complication:  None    Hearing quality:  Normal    Patient tolerance of procedure:   Tolerated well, no immediate complications

## 2018-08-03 NOTE — ASSESSMENT & PLAN NOTE
Will start patient on Ceftin BID for 7 days  Of note patient has completed his pneumonia vaccination  Patient does history of GI upset with Augmentin and penicillin  Advised patient to take antibiotic with food to prevent GI upset  Rest and fluids advised  Educated that the course of this illness could be 2-4 weeks  Discussed symptomatic relief, such as warm steam inhalations, tylenol/ibuprofen for fevers and body aches, rest, and drink plenty of fluids  Warm salt gargles for sore throat  Discussed red flag signs to go to the ER, such as chest pain or shortness of breath     Return to the office for reevaluation if symptoms worsen or do not improve in 1-2 weeks

## 2018-09-05 ENCOUNTER — ANTICOAG VISIT (OUTPATIENT)
Dept: INTERNAL MEDICINE CLINIC | Facility: CLINIC | Age: 83
End: 2018-09-05

## 2018-09-05 LAB — INR PPP: 2.3 (ref 0.86–1.17)

## 2018-10-16 DIAGNOSIS — I10 HYPERTENSION, UNSPECIFIED TYPE: ICD-10-CM

## 2018-10-16 RX ORDER — DILTIAZEM HYDROCHLORIDE 240 MG/1
CAPSULE, COATED, EXTENDED RELEASE ORAL
Qty: 90 CAPSULE | Refills: 1 | Status: SHIPPED | OUTPATIENT
Start: 2018-10-16 | End: 2019-04-14 | Stop reason: SDUPTHER

## 2018-10-22 ENCOUNTER — ANTICOAG VISIT (OUTPATIENT)
Dept: INTERNAL MEDICINE CLINIC | Facility: CLINIC | Age: 83
End: 2018-10-22

## 2018-10-22 LAB — INR PPP: 3.6 (ref 0.86–1.17)

## 2018-10-22 NOTE — PROGRESS NOTES
Called pt  He confirmed current Coumadin dose 2 5 mg on Thur and 5 mg all other days  He will hold dose for 2 days then back to 2 5 mg Thur and 5 mg all other days    Recheck INR in 1 wk   Stephani Matias

## 2018-10-22 NOTE — PROGRESS NOTES
Please contact patient to inform him that his INR is 3 6  Confirm patient has been taking warfarin as prescribed, 2 5 mg on Thursdays and 5 mg remaining days  Recommendations are to hold warfarin for the next 2 days, then continue with current regimen as his INR has been stable at 2 3 over the past 3 months  Recheck INR in 1 week

## 2018-10-24 ENCOUNTER — OFFICE VISIT (OUTPATIENT)
Dept: INTERNAL MEDICINE CLINIC | Facility: CLINIC | Age: 83
End: 2018-10-24
Payer: COMMERCIAL

## 2018-10-24 VITALS
BODY MASS INDEX: 26.55 KG/M2 | DIASTOLIC BLOOD PRESSURE: 68 MMHG | TEMPERATURE: 98.2 F | OXYGEN SATURATION: 97 % | HEART RATE: 89 BPM | HEIGHT: 68 IN | WEIGHT: 175.2 LBS | SYSTOLIC BLOOD PRESSURE: 140 MMHG

## 2018-10-24 DIAGNOSIS — R04.0 EPISTAXIS: ICD-10-CM

## 2018-10-24 DIAGNOSIS — N18.30 CKD (CHRONIC KIDNEY DISEASE), STAGE III (HCC): ICD-10-CM

## 2018-10-24 DIAGNOSIS — I10 ESSENTIAL HYPERTENSION, BENIGN: ICD-10-CM

## 2018-10-24 DIAGNOSIS — I10 BENIGN ESSENTIAL HYPERTENSION: ICD-10-CM

## 2018-10-24 DIAGNOSIS — I48.20 CHRONIC ATRIAL FIBRILLATION (HCC): Primary | ICD-10-CM

## 2018-10-24 PROCEDURE — 4040F PNEUMOC VAC/ADMIN/RCVD: CPT | Performed by: INTERNAL MEDICINE

## 2018-10-24 PROCEDURE — 99214 OFFICE O/P EST MOD 30 MIN: CPT | Performed by: INTERNAL MEDICINE

## 2018-10-24 PROCEDURE — 1036F TOBACCO NON-USER: CPT | Performed by: INTERNAL MEDICINE

## 2018-10-24 RX ORDER — LOSARTAN POTASSIUM 100 MG/1
100 TABLET ORAL DAILY
Qty: 90 TABLET | Refills: 1 | Status: SHIPPED | OUTPATIENT
Start: 2018-10-24 | End: 2019-04-18 | Stop reason: SDUPTHER

## 2018-10-24 NOTE — PROGRESS NOTES
Assessment/Plan:    Atrial fibrillation (HCC)  Ventricular response stable PT INR was supratherapeutic  Patient has experienced some intermittent epistaxis  Dosage adjusted follow-up lab work scheduled  Benign essential hypertension  Blood pressure is on the high range of normal we will increase his Cozaar from 50 to 100 mg and monitor his renal function  CKD (chronic kidney disease), stage III  Renal function has remained stable  We will continue to monitor with the increase in losartan  He has been intolerant load of pain in the past     Epistaxis  Intermittent epistaxis  INR was subtherapeutic  Dosage adjusted  Diagnoses and all orders for this visit:    Chronic atrial fibrillation (HCC)  -     CBC and Platelet; Future  -     Comprehensive metabolic panel; Future  -     TSH, 3rd generation with Free T4 reflex; Future  -     T3; Future    Benign essential hypertension  -     Basic metabolic panel; Future  -     CBC and Platelet; Future  -     Comprehensive metabolic panel; Future  -     TSH, 3rd generation with Free T4 reflex; Future  -     T3; Future    CKD (chronic kidney disease), stage III (HCC)  -     Basic metabolic panel; Future  -     CBC and Platelet; Future  -     Comprehensive metabolic panel; Future    Epistaxis    Essential hypertension, benign  -     losartan (COZAAR) 100 MG tablet; Take 1 tablet (100 mg total) by mouth daily for 180 days          Subjective:      Patient ID: Kolton Medel is a 80 y o  male  Patient presents to the office for follow-up visit  In general he is feeling well  He has been experiencing intermittent episodes of epistaxis  His most recent INR was supratherapeutic  His dosage was adjusted and follow-up labs are pending  His blood pressure is noted to be on the high side of normal   He denies any symptoms  He is currently taking 50 mg of losartan  His labs were reviewed  Renal function has remained relatively stable    Hemoglobin hematocrit stable as well  He denies any episodes of palpitations, lightheadedness, chest pain, dizzy spells, weakness, numbness or tingling of the extremities  He has had no speech issues and denies any visual symptoms  Appetite has been good and weight has been stable  Family History   Problem Relation Age of Onset    No Known Problems Mother     Coronary artery disease Family     Emphysema Father      Social History     Social History    Marital status: /Civil Union     Spouse name: N/A    Number of children: N/A    Years of education: N/A     Occupational History    Not on file       Social History Main Topics    Smoking status: Former Smoker     Types: Cigarettes    Smokeless tobacco: Never Used    Alcohol use Yes      Comment: beer consumption (4 bottles per day)    Drug use: No    Sexual activity: Not on file     Other Topics Concern    Not on file     Social History Narrative    Caffeine use    Copy of advance directive in patient's chart     Past Medical History:   Diagnosis Date    Allergic rhinitis     last assessed: 11/10/2014    [facility] p[facility]is ([facility] )     Anxiety     Arthritis     last assessed: 8/3/2015    Atrial fibrillation (HCC)     Callus of foot     last assessed: 9/7/2016    Cataract     CKD (chronic kidney disease), stage III ([facility] ) 9/7/2016    Esophagitis, reflux     last assessed: 11/10/2014    Hypertension     Osteoarthritis of both knees     Rheumatic fever     Right bundle branch block     Shortness of breath     Tachycardia     Venous thrombosis     Ventral hernia     last assessed: 3/20/2015    Vitamin D deficiency        Current Outpatient Prescriptions:     diltiazem (CARDIZEM CD) 240 mg 24 hr capsule, TAKE 1 CAPSULE BY MOUTH EVERY DAY, Disp: 90 capsule, Rfl: 1    losartan (COZAAR) 100 MG tablet, Take 1 tablet (100 mg total) by mouth daily for 180 days, Disp: 90 tablet, Rfl: 1    nitroglycerin (NITROSTAT) 0 4 mg SL tablet, Place 1 tablet (0 4 mg total) under the tongue every 5 (five) minutes as needed for chest pain, Disp: 90 tablet, Rfl: 1    warfarin (COUMADIN) 5 mg tablet, Take 1 tablet (5 mg total) by mouth daily or as directed, Disp: 90 tablet, Rfl: 1  Allergies   Allergen Reactions    Amlodipine     Augmentin  [Amoxicillin-Pot Clavulanate] GI Intolerance    Indomethacin GI Intolerance    Nitroglycerin     Penicillins      History reviewed  No pertinent surgical history  Review of Systems   Constitutional: Negative  HENT:        Left-sided epistaxis intermittently   Eyes: Negative  Respiratory: Negative  Cardiovascular: Negative  Gastrointestinal: Negative  Genitourinary: Negative  Neurological: Negative  All other systems reviewed and are negative  Objective:      /68 (BP Location: Left arm, Patient Position: Sitting, Cuff Size: Standard)   Pulse 89   Temp 98 2 °F (36 8 °C) (Oral)   Ht 5' 8" (1 727 m)   Wt 79 5 kg (175 lb 3 2 oz)   SpO2 97%   BMI 26 64 kg/m²          Physical Exam   Constitutional: He is oriented to person, place, and time  He appears well-developed and well-nourished  No distress  HENT:   Head: Normocephalic and atraumatic  Nose: Nose normal    No active areas of bleeding noted   Eyes: Conjunctivae are normal    Neck: Neck supple  No JVD present  No tracheal deviation present  Cardiovascular: Normal rate and normal heart sounds  No murmur heard  Irregularly irregular   Pulmonary/Chest: Effort normal  No respiratory distress  He has no wheezes  He has no rales  Abdominal: Soft  He exhibits no distension  Musculoskeletal: Normal range of motion  He exhibits no edema  Lymphadenopathy:     He has no cervical adenopathy  Neurological: He is alert and oriented to person, place, and time  Skin: Skin is warm and dry  Psychiatric: He has a normal mood and affect  Thought content normal    Vitals reviewed

## 2018-10-25 NOTE — ASSESSMENT & PLAN NOTE
Blood pressure is on the high range of normal we will increase his Cozaar from 50 to 100 mg and monitor his renal function

## 2018-10-25 NOTE — ASSESSMENT & PLAN NOTE
Renal function has remained stable  We will continue to monitor with the increase in losartan    He has been intolerant load of pain in the past

## 2018-10-25 NOTE — ASSESSMENT & PLAN NOTE
Ventricular response stable PT INR was supratherapeutic  Patient has experienced some intermittent epistaxis  Dosage adjusted follow-up lab work scheduled

## 2018-11-01 ENCOUNTER — ANTICOAG VISIT (OUTPATIENT)
Dept: INTERNAL MEDICINE CLINIC | Facility: CLINIC | Age: 83
End: 2018-11-01

## 2018-11-01 ENCOUNTER — TELEPHONE (OUTPATIENT)
Dept: INTERNAL MEDICINE CLINIC | Facility: CLINIC | Age: 83
End: 2018-11-01

## 2018-11-01 LAB — INR PPP: 3 (ref 0.86–1.17)

## 2018-11-01 NOTE — TELEPHONE ENCOUNTER
JERMAN on [phone] with current Coumadin dose 2 5 mg on Thur and 5 mg allthe other days  He is to cont same dose and recheck INR in 2 wks  C/B to confirm info    jgz

## 2018-12-18 DIAGNOSIS — I48.91 ATRIAL FIBRILLATION, UNSPECIFIED TYPE (HCC): ICD-10-CM

## 2018-12-18 RX ORDER — WARFARIN SODIUM 5 MG/1
5 TABLET ORAL DAILY
Qty: 90 TABLET | Refills: 1 | OUTPATIENT
Start: 2018-12-18

## 2018-12-23 DIAGNOSIS — I48.91 ATRIAL FIBRILLATION, UNSPECIFIED TYPE (HCC): ICD-10-CM

## 2018-12-30 RX ORDER — WARFARIN SODIUM 5 MG/1
5 TABLET ORAL DAILY
Qty: 90 TABLET | Refills: 1 | OUTPATIENT
Start: 2018-12-30

## 2018-12-31 DIAGNOSIS — I48.91 ATRIAL FIBRILLATION, UNSPECIFIED TYPE (HCC): ICD-10-CM

## 2019-01-02 RX ORDER — WARFARIN SODIUM 5 MG/1
5 TABLET ORAL DAILY
Qty: 90 TABLET | Refills: 0 | Status: SHIPPED | OUTPATIENT
Start: 2019-01-02 | End: 2019-03-27 | Stop reason: SDUPTHER

## 2019-02-14 ENCOUNTER — TELEPHONE (OUTPATIENT)
Dept: INTERNAL MEDICINE CLINIC | Facility: CLINIC | Age: 84
End: 2019-02-14

## 2019-03-27 DIAGNOSIS — I48.91 ATRIAL FIBRILLATION, UNSPECIFIED TYPE (HCC): ICD-10-CM

## 2019-03-28 ENCOUNTER — TELEPHONE (OUTPATIENT)
Dept: INTERNAL MEDICINE CLINIC | Age: 84
End: 2019-03-28

## 2019-03-28 ENCOUNTER — OFFICE VISIT (OUTPATIENT)
Dept: INTERNAL MEDICINE CLINIC | Facility: CLINIC | Age: 84
End: 2019-03-28
Payer: COMMERCIAL

## 2019-03-28 VITALS
WEIGHT: 174.6 LBS | BODY MASS INDEX: 26.55 KG/M2 | TEMPERATURE: 97.4 F | DIASTOLIC BLOOD PRESSURE: 58 MMHG | SYSTOLIC BLOOD PRESSURE: 102 MMHG | HEART RATE: 66 BPM

## 2019-03-28 DIAGNOSIS — I48.20 CHRONIC ATRIAL FIBRILLATION (HCC): Primary | ICD-10-CM

## 2019-03-28 DIAGNOSIS — J40 BRONCHITIS: Primary | ICD-10-CM

## 2019-03-28 PROCEDURE — 1036F TOBACCO NON-USER: CPT | Performed by: NURSE PRACTITIONER

## 2019-03-28 PROCEDURE — 3725F SCREEN DEPRESSION PERFORMED: CPT | Performed by: NURSE PRACTITIONER

## 2019-03-28 PROCEDURE — 99213 OFFICE O/P EST LOW 20 MIN: CPT | Performed by: NURSE PRACTITIONER

## 2019-03-28 PROCEDURE — 1160F RVW MEDS BY RX/DR IN RCRD: CPT | Performed by: NURSE PRACTITIONER

## 2019-03-28 RX ORDER — WARFARIN SODIUM 5 MG/1
5 TABLET ORAL DAILY
Qty: 90 TABLET | Refills: 0 | Status: SHIPPED | OUTPATIENT
Start: 2019-03-28 | End: 2019-05-01 | Stop reason: SDUPTHER

## 2019-03-28 RX ORDER — AZITHROMYCIN 250 MG/1
TABLET, FILM COATED ORAL
Qty: 6 TABLET | Refills: 0 | Status: SHIPPED | OUTPATIENT
Start: 2019-03-28 | End: 2019-04-01

## 2019-03-28 NOTE — PROGRESS NOTES
Assessment/Plan:    Bronchitis  -  S/sx x 2 weeks, + productive cough and SOB  - will start zithromax - last renal fx 10/2018  Creat clear 43  - recommend plain robitussin  - d/w pt abx will interfer with coumadin   - he is overdue for INR  His daughter in law will take him after appt today  D/w pt and daughter in law importance of INR for sub/supr therapeutic dosing     Diagnoses and all orders for this visit:    Bronchitis  -     azithromycin (ZITHROMAX) 250 mg tablet; Take 2 tablets today then 1 tablet daily x 4 days        Subjective:      Patient ID: Mirna Shea is a 80 y o  male  URI    This is a new problem  Episode onset: 2 weeks  The problem has been waxing and waning  There has been no fever  Associated symptoms include congestion (thick), coughing, diarrhea (2 BM yesterday - loose stool) and headaches  Pertinent negatives include no abdominal pain, chest pain, ear pain, nausea, sinus pain, sore throat, vomiting or wheezing  He has tried nothing for the symptoms  The treatment provided no relief  Pt great grandson was recently ill  The following portions of the patient's history were reviewed and updated as appropriate: allergies, current medications, past family history, past medical history, past social history, past surgical history and problem list     Review of Systems   Constitutional: Positive for fatigue  Negative for appetite change (intermittent decreased appetite), chills and fever  HENT: Positive for congestion (thick), postnasal drip and sinus pressure  Negative for ear pain, sinus pain and sore throat  Respiratory: Positive for cough and shortness of breath  Negative for wheezing  Cardiovascular: Negative for chest pain and palpitations  Gastrointestinal: Positive for diarrhea (2 BM yesterday - loose stool)  Negative for abdominal pain, constipation, nausea and vomiting  Neurological: Positive for headaches  Negative for dizziness and light-headedness           Past Medical History:   Diagnosis Date    Allergic rhinitis     last assessed: 11/10/2014    Angina pectoris ([facility] )     Anxiety     Arthritis     last assessed: 8/3/2015    Atrial fibrillation (HCC)     Callus of foot     last assessed: 9/7/2016    Cataract     CKD (chronic kidney disease), stage III ([facility] ) 9/7/2016    Esophagitis, reflux     last assessed: 11/10/2014    Hypertension     Osteoarthritis of both knees     Rheumatic fever     Right bundle branch block     Shortness of breath     Tachycardia     Venous thrombosis     Ventral hernia     last assessed: 3/20/2015    Vitamin D deficiency          Current Outpatient Medications:     diltiazem (CARDIZEM CD) 240 mg 24 hr capsule, TAKE 1 CAPSULE BY MOUTH EVERY DAY, Disp: 90 capsule, Rfl: 1    losartan (COZAAR) 100 MG tablet, Take 1 tablet (100 mg total) by mouth daily for 180 days, Disp: 90 tablet, Rfl: 1    nitroglycerin (NITROSTAT) 0 4 mg SL tablet, Place 1 tablet (0 4 mg total) under the tongue every 5 (five) minutes as needed for chest pain, Disp: 90 tablet, Rfl: 1    warfarin (COUMADIN) 5 mg tablet, Take 1 tablet (5 mg total) by mouth daily or as directed, Disp: 90 tablet, Rfl: 0    azithromycin (ZITHROMAX) 250 mg tablet, Take 2 tablets today then 1 tablet daily x 4 days, Disp: 6 tablet, Rfl: 0    Allergies   Allergen Reactions    Amlodipine     Augmentin  [Amoxicillin-Pot Clavulanate] GI Intolerance    Indomethacin GI Intolerance    Nitroglycerin     Penicillins        Social History   History reviewed  No pertinent surgical history    Family History   Problem Relation Age of Onset    No Known Problems Mother     Coronary artery disease Family     Emphysema Father        Objective:  /58 (BP Location: Left arm, Patient Position: Sitting, Cuff Size: Standard)   Pulse 66   Temp (!) 97 4 °F (36 3 °C) (Oral)   Wt 79 2 kg (174 lb 9 6 oz) Comment: shoes on  BMI 26 55 kg/m²      Physical Exam   Constitutional: He is oriented to person, place, and time  He appears well-developed and well-nourished  No distress  HENT:   Head: Normocephalic and atraumatic  Right Ear: Hearing, tympanic membrane, external ear and ear canal normal    Left Ear: Hearing, tympanic membrane, external ear and ear canal normal    Nose: Rhinorrhea (clear) present  No mucosal edema  Right sinus exhibits no maxillary sinus tenderness and no frontal sinus tenderness  Left sinus exhibits no maxillary sinus tenderness and no frontal sinus tenderness  Mouth/Throat: Uvula is midline, oropharynx is clear and moist and mucous membranes are normal  No oropharyngeal exudate  Mild post nasal gtt  Moderate cerumen L ear   Cardiovascular: Normal rate and regular rhythm  Pulmonary/Chest: Effort normal  No respiratory distress  He has no wheezes  Lungs decreased throughout   Neurological: He is alert and oriented to person, place, and time  Skin: Skin is warm and dry  Psychiatric: He has a normal mood and affect  His behavior is normal  Judgment and thought content normal    Nursing note and vitals reviewed

## 2019-03-28 NOTE — TELEPHONE ENCOUNTER
Patient is at the lab now but his PT/INR script is   Can you fax a new one over? He will wait for it  Please fax to [phone]  Thanks

## 2019-03-29 ENCOUNTER — ANTICOAG VISIT (OUTPATIENT)
Dept: INTERNAL MEDICINE CLINIC | Facility: CLINIC | Age: 84
End: 2019-03-29

## 2019-03-29 DIAGNOSIS — I48.20 CHRONIC ATRIAL FIBRILLATION (HCC): Primary | ICD-10-CM

## 2019-03-29 LAB — INR PPP: 1.2 (ref 0.86–1.17)

## 2019-04-14 DIAGNOSIS — I10 HYPERTENSION, UNSPECIFIED TYPE: ICD-10-CM

## 2019-04-15 RX ORDER — DILTIAZEM HYDROCHLORIDE 240 MG/1
CAPSULE, COATED, EXTENDED RELEASE ORAL
Qty: 90 CAPSULE | Refills: 1 | Status: SHIPPED | OUTPATIENT
Start: 2019-04-15 | End: 2019-05-01 | Stop reason: SDUPTHER

## 2019-04-18 DIAGNOSIS — I10 ESSENTIAL HYPERTENSION, BENIGN: ICD-10-CM

## 2019-04-18 RX ORDER — LOSARTAN POTASSIUM 100 MG/1
100 TABLET ORAL DAILY
Qty: 90 TABLET | Refills: 1 | Status: SHIPPED | OUTPATIENT
Start: 2019-04-18 | End: 2019-05-01 | Stop reason: SDUPTHER

## 2019-04-30 ENCOUNTER — TELEPHONE (OUTPATIENT)
Dept: OTHER | Facility: OTHER | Age: 84
End: 2019-04-30

## 2019-05-01 ENCOUNTER — TELEPHONE (OUTPATIENT)
Dept: INTERNAL MEDICINE CLINIC | Facility: CLINIC | Age: 84
End: 2019-05-01

## 2019-05-01 ENCOUNTER — ANTICOAG VISIT (OUTPATIENT)
Dept: INTERNAL MEDICINE CLINIC | Facility: CLINIC | Age: 84
End: 2019-05-01

## 2019-05-01 ENCOUNTER — OFFICE VISIT (OUTPATIENT)
Dept: INTERNAL MEDICINE CLINIC | Facility: CLINIC | Age: 84
End: 2019-05-01
Payer: COMMERCIAL

## 2019-05-01 VITALS
WEIGHT: 176.6 LBS | TEMPERATURE: 97.7 F | DIASTOLIC BLOOD PRESSURE: 80 MMHG | BODY MASS INDEX: 26.76 KG/M2 | OXYGEN SATURATION: 97 % | HEIGHT: 68 IN | HEART RATE: 88 BPM | SYSTOLIC BLOOD PRESSURE: 136 MMHG

## 2019-05-01 DIAGNOSIS — H54.62 VISION LOSS OF LEFT EYE: ICD-10-CM

## 2019-05-01 DIAGNOSIS — M15.9 PRIMARY OSTEOARTHRITIS INVOLVING MULTIPLE JOINTS: ICD-10-CM

## 2019-05-01 DIAGNOSIS — N18.30 CKD (CHRONIC KIDNEY DISEASE), STAGE III (HCC): ICD-10-CM

## 2019-05-01 DIAGNOSIS — I48.91 ATRIAL FIBRILLATION, UNSPECIFIED TYPE (HCC): ICD-10-CM

## 2019-05-01 DIAGNOSIS — I10 BENIGN ESSENTIAL HYPERTENSION: ICD-10-CM

## 2019-05-01 DIAGNOSIS — R79.1 SUPRATHERAPEUTIC INTERNATIONAL NORMALIZED RATIO (INR): ICD-10-CM

## 2019-05-01 DIAGNOSIS — L84 CORNS AND CALLUS: ICD-10-CM

## 2019-05-01 DIAGNOSIS — Z23 NEED FOR TDAP VACCINATION: Primary | ICD-10-CM

## 2019-05-01 DIAGNOSIS — H61.23 BILATERAL IMPACTED CERUMEN: ICD-10-CM

## 2019-05-01 DIAGNOSIS — B35.1 MYCOTIC TOENAILS: ICD-10-CM

## 2019-05-01 PROBLEM — J40 BRONCHITIS: Status: RESOLVED | Noted: 2019-03-28 | Resolved: 2019-05-01

## 2019-05-01 LAB — INR PPP: 5.3 (ref 0.86–1.17)

## 2019-05-01 PROCEDURE — 11305 SHAVE SKIN LESION 0.5 CM/<: CPT | Performed by: INTERNAL MEDICINE

## 2019-05-01 PROCEDURE — 69210 REMOVE IMPACTED EAR WAX UNI: CPT | Performed by: INTERNAL MEDICINE

## 2019-05-01 PROCEDURE — 1170F FXNL STATUS ASSESSED: CPT | Performed by: INTERNAL MEDICINE

## 2019-05-01 PROCEDURE — 1125F AMNT PAIN NOTED PAIN PRSNT: CPT | Performed by: INTERNAL MEDICINE

## 2019-05-01 PROCEDURE — 99214 OFFICE O/P EST MOD 30 MIN: CPT | Performed by: INTERNAL MEDICINE

## 2019-05-01 PROCEDURE — G0439 PPPS, SUBSEQ VISIT: HCPCS | Performed by: INTERNAL MEDICINE

## 2019-05-01 RX ORDER — DILTIAZEM HYDROCHLORIDE 240 MG/1
240 CAPSULE, COATED, EXTENDED RELEASE ORAL DAILY
Qty: 90 CAPSULE | Refills: 1 | Status: SHIPPED | OUTPATIENT
Start: 2019-05-01

## 2019-05-01 RX ORDER — LOSARTAN POTASSIUM 100 MG/1
100 TABLET ORAL DAILY
Qty: 90 TABLET | Refills: 1 | Status: SHIPPED | OUTPATIENT
Start: 2019-05-01 | End: 2019-10-28

## 2019-05-01 RX ORDER — WARFARIN SODIUM 5 MG/1
5 TABLET ORAL DAILY
Qty: 90 TABLET | Refills: 0 | Status: SHIPPED | OUTPATIENT
Start: 2019-05-01 | End: 2019-08-31 | Stop reason: SDUPTHER

## 2019-05-07 ENCOUNTER — ANTICOAG VISIT (OUTPATIENT)
Dept: INTERNAL MEDICINE CLINIC | Facility: CLINIC | Age: 84
End: 2019-05-07

## 2019-05-07 LAB — INR PPP: 1.6 (ref 0.86–1.17)

## 2019-07-03 ENCOUNTER — OFFICE VISIT (OUTPATIENT)
Dept: INTERNAL MEDICINE CLINIC | Facility: CLINIC | Age: 84
End: 2019-07-03
Payer: COMMERCIAL

## 2019-07-03 VITALS
OXYGEN SATURATION: 98 % | HEIGHT: 68 IN | WEIGHT: 175 LBS | SYSTOLIC BLOOD PRESSURE: 110 MMHG | HEART RATE: 99 BPM | TEMPERATURE: 97.9 F | DIASTOLIC BLOOD PRESSURE: 62 MMHG | BODY MASS INDEX: 26.52 KG/M2

## 2019-07-03 DIAGNOSIS — H10.32 ACUTE BACTERIAL CONJUNCTIVITIS OF LEFT EYE: ICD-10-CM

## 2019-07-03 DIAGNOSIS — H54.62 VISION LOSS OF LEFT EYE: Primary | ICD-10-CM

## 2019-07-03 PROCEDURE — 99214 OFFICE O/P EST MOD 30 MIN: CPT | Performed by: NURSE PRACTITIONER

## 2019-07-03 RX ORDER — POLYMYXIN B SULFATE AND TRIMETHOPRIM 1; 10000 MG/ML; [USP'U]/ML
1 SOLUTION OPHTHALMIC EVERY 4 HOURS
Qty: 10 ML | Refills: 0 | Status: SHIPPED | OUTPATIENT
Start: 2019-07-03

## 2019-07-03 NOTE — PATIENT INSTRUCTIONS
Will start eye drops for your L eye for infection    Follow-up with eye doctor    Go get your coumadin check - you are way over due

## 2019-07-03 NOTE — ASSESSMENT & PLAN NOTE
- ongoing x 4+ months, has been instructed to see opthamology in past (pt admitted to this as well)  - re-encouraged pt to see opthalmology  - has appt end of july

## 2019-07-03 NOTE — PROGRESS NOTES
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     - ongoing x 4+ months, has been instructed to see opthamology in past (pt admitted to this as well)  - re-encouraged pt to see opthalmology  - has appt end of july         Relevant Medications    polymyxin b-trimethoprim (POLYTRIM) ophthalmic solution    Acute bacterial conjunctivitis of left eye     - L eye  - will start polytrim  - eye irritation unrelated to fall         Relevant Medications    polymyxin b-trimethoprim (POLYTRIM) ophthalmic solution        Of note patient is currently on Coumadin  He is noncompliant and has not had an INR checked in almost two months  Instructed to get done today    Health Maintenance Items:  - BMI Counseling: Body mass index is 27 kg/m²  Discussed the patient's BMI with him  The BMI is above average  BMI counseling and education was provided to the patient  Nutrition recommendations include reducing portion sizes, decreasing overall calorie intake, moderation in carbohydrate intake and increasing intake of lean protein  Exercise recommendations include exercising 3-5 times per week  M*Modal software was used to dictate this note  It may contain errors with dictating incorrect words or incorrect spelling  Please contact the provider directly with any questions  Subjective:      Patient ID: Reece Ramirez is a 80 y o  male  HPI    Patient presents complaining of irritation to his left eye  Patient reports that he did have a fall approximately 1 5 weeks ago  He was evaluated by EMS the patient deferred on transportation to the emergency department  His son was present during evaluation as well  Patient reports since that time he has been complaining of irritation is left eye  Patient describes that as increased tearing, frequent blinking, drainage to his night, sensation like something is in his eye  Patient denies any injury besides the fall      (+) exposure to great grandson who may have had URI    Patient is have 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  Patient reports this occurred a couple months ago  He reports it is totally black on his left eye  There is no vision  Patient was seen by Dr Susana Wahl and recommended follow up with Ophthalmology at that time however he did not  Patient did call Ophthalmology now due to irritation  He has an appointment at the end of July  Of note patient is currently on Coumadin  He is noncompliant and has not had an INR checked in almost two months  Patient denies headaches, dizziness, lightheadedness, changes in vision since the fall, confusion, back pain, joint pain    The following portions of the patient's history were reviewed and updated as appropriate: allergies, current medications, past family history, past medical history, past social history, past surgical history and problem list     Review of Systems   Constitutional: Negative for chills, fatigue and fever  HENT: Negative for congestion, postnasal drip, sinus pressure and sinus pain  Eyes: Positive for photophobia, discharge and itching  Negative for pain, redness and visual disturbance  Respiratory: Negative for cough, chest tightness, shortness of breath and wheezing  Cardiovascular: Negative for chest pain and palpitations  Gastrointestinal: Negative for constipation, diarrhea, nausea and vomiting  Neurological: Negative for dizziness, light-headedness and headaches           Past Medical History:   Diagnosis Date    Allergic rhinitis     last assessed: 11/10/2014    Angina pectoris ([facility] [address] )     Anxiety     Arthritis     last assessed: 8/3/2015    Atrial fibrillation ([address] )     Bronchitis 3/28/2019    Callus of foot     last assessed: 9/7/2016    Cataract     CKD (chronic kidney disease), stage III ([address] ) 9/7/2016    Esophagitis, reflux     last assessed: 11/10/2014    Hypertension     Osteoarthritis of both knees     Rheumatic fever     Right bundle branch block     Shortness of breath     Tachycardia     Venous thrombosis     Ventral hernia     last assessed: 3/20/2015    Vitamin D deficiency          Current Outpatient Medications:     diltiazem (CARDIZEM CD) 240 mg 24 hr capsule, Take 1 capsule (240 mg total) by mouth daily, Disp: 90 capsule, Rfl: 1    losartan (COZAAR) 100 MG tablet, Take 1 tablet (100 mg total) by mouth daily for 180 days, Disp: 90 tablet, Rfl: 1    nitroglycerin (NITROSTAT) 0 4 mg SL tablet, Place 1 tablet (0 4 mg total) under the tongue every 5 (five) minutes as needed for chest pain, Disp: 90 tablet, Rfl: 1    warfarin (COUMADIN) 5 mg tablet, Take 1 tablet (5 mg total) by mouth daily or as directed, Disp: 90 tablet, Rfl: 0    polymyxin b-trimethoprim (POLYTRIM) ophthalmic solution, Administer 1 drop into the left eye every 4 (four) hours, Disp: 10 mL, Rfl: 0    Allergies   Allergen Reactions    Amlodipine     Augmentin [Amoxicillin-Pot Clavulanate] GI Intolerance    Indomethacin GI Intolerance    Penicillins GI Intolerance       Social History   History reviewed  No pertinent surgical history  Family History   Problem Relation Age of Onset    No Known Problems Mother     Coronary artery disease Family     Emphysema Father        Objective:  /62 (BP Location: Left arm, Patient Position: Sitting, Cuff Size: Standard)   Pulse 99   Temp 97 9 °F (36 6 °C) (Oral)   Ht 5' 7 5" (1 715 m)   Wt 79 4 kg (175 lb)   SpO2 98%   BMI 27 00 kg/m²      Physical Exam   Constitutional: He is oriented to person, place, and time  He appears well-developed and well-nourished  No distress  Eyes: Right eye exhibits no chemosis, no discharge, no exudate and no hordeolum  No foreign body present in the right eye  Left eye exhibits no chemosis, no discharge, no exudate and no hordeolum  No foreign body present in the left eye  Right conjunctiva is not injected   Right conjunctiva has no hemorrhage  Left conjunctiva is injected  Left conjunctiva has no hemorrhage    (+) increased tearing  Cardiovascular: Normal rate, regular rhythm and normal heart sounds  Pulmonary/Chest: Effort normal and breath sounds normal  No respiratory distress  He has no wheezes  Neurological: He is alert and oriented to person, place, and time  No focal deficits   Skin: Skin is warm and dry  Psychiatric: He has a normal mood and affect  His behavior is normal  Judgment and thought content normal    Nursing note and vitals reviewed

## 2019-07-15 ENCOUNTER — TELEPHONE (OUTPATIENT)
Dept: INTERNAL MEDICINE CLINIC | Facility: CLINIC | Age: 84
End: 2019-07-15

## 2019-07-15 NOTE — TELEPHONE ENCOUNTER
Chance Farr was due for his PT/INR 5/20/19  I called and asked that he go for this test today  He stated that he will try to go this week but he is very busy

## 2019-08-14 ENCOUNTER — ANTICOAG VISIT (OUTPATIENT)
Dept: INTERNAL MEDICINE CLINIC | Facility: CLINIC | Age: 84
End: 2019-08-14

## 2019-08-14 LAB — INR PPP: 2.4 (ref 0.84–1.19)

## 2019-08-16 ENCOUNTER — ANCILLARY ORDERS (OUTPATIENT)
Dept: INTERNAL MEDICINE CLINIC | Facility: CLINIC | Age: 84
End: 2019-08-16

## 2019-08-16 DIAGNOSIS — I48.20 CHRONIC ATRIAL FIBRILLATION (HCC): ICD-10-CM

## 2019-08-31 DIAGNOSIS — I48.91 ATRIAL FIBRILLATION, UNSPECIFIED TYPE (HCC): ICD-10-CM

## 2019-09-03 RX ORDER — WARFARIN SODIUM 5 MG/1
5 TABLET ORAL DAILY
Qty: 90 TABLET | Refills: 0 | Status: SHIPPED | OUTPATIENT
Start: 2019-09-03

## 2019-09-06 ENCOUNTER — ANCILLARY ORDERS (OUTPATIENT)
Dept: INTERNAL MEDICINE CLINIC | Facility: CLINIC | Age: 84
End: 2019-09-06

## 2019-09-06 DIAGNOSIS — I48.20 CHRONIC ATRIAL FIBRILLATION (HCC): ICD-10-CM

## 2019-09-13 ENCOUNTER — ANCILLARY ORDERS (OUTPATIENT)
Dept: INTERNAL MEDICINE CLINIC | Facility: CLINIC | Age: 84
End: 2019-09-13

## 2019-09-13 DIAGNOSIS — I48.20 CHRONIC ATRIAL FIBRILLATION (HCC): ICD-10-CM

## 2019-09-20 ENCOUNTER — ANCILLARY ORDERS (OUTPATIENT)
Dept: INTERNAL MEDICINE CLINIC | Facility: CLINIC | Age: 84
End: 2019-09-20

## 2019-09-20 DIAGNOSIS — I48.20 CHRONIC ATRIAL FIBRILLATION (HCC): ICD-10-CM

## 2019-10-14 ENCOUNTER — TELEPHONE (OUTPATIENT)
Dept: INTERNAL MEDICINE CLINIC | Facility: CLINIC | Age: 84
End: 2019-10-14

## 2019-10-14 NOTE — TELEPHONE ENCOUNTER
Spoke with pt  Reminded him that his INR is 1 month overdue  He said that he believes that he went in September  I called [facility] and the last INR that they resulted was from 8/13/19  Daron Meehan and he said that he will go this week for the blood work   jgz

## 2019-12-03 ENCOUNTER — TELEPHONE (OUTPATIENT)
Dept: INTERNAL MEDICINE CLINIC | Facility: CLINIC | Age: 84
End: 2019-12-03

## 2019-12-03 DIAGNOSIS — I48.91 ATRIAL FIBRILLATION, UNSPECIFIED TYPE (HCC): ICD-10-CM

## 2019-12-05 RX ORDER — WARFARIN SODIUM 5 MG/1
5 TABLET ORAL DAILY
Qty: 90 TABLET | Refills: 0 | OUTPATIENT
Start: 2019-12-05

## 2020-02-25 ENCOUNTER — ANTICOAG VISIT (OUTPATIENT)
Dept: INTERNAL MEDICINE CLINIC | Facility: CLINIC | Age: 85
End: 2020-02-25

## 2020-02-25 NOTE — PROGRESS NOTES
This INR request is a duplicate, as newer orders have been placed by the provider  Pt is overdue for INR check since Aug 13, 2019

## 2020-03-18 ENCOUNTER — TELEPHONE (OUTPATIENT)
Dept: INTERNAL MEDICINE CLINIC | Facility: CLINIC | Age: 85
End: 2020-03-18

## 2020-03-18 NOTE — TELEPHONE ENCOUNTER
Dr Nabeel Perez,    Pt passed away  Printed decedent note from [facility]  Card sent to [facility] for your loss